# Patient Record
Sex: FEMALE | Race: OTHER | HISPANIC OR LATINO | URBAN - METROPOLITAN AREA
[De-identification: names, ages, dates, MRNs, and addresses within clinical notes are randomized per-mention and may not be internally consistent; named-entity substitution may affect disease eponyms.]

---

## 2021-01-01 ENCOUNTER — NURSE TRIAGE (OUTPATIENT)
Dept: OTHER | Facility: OTHER | Age: 26
End: 2021-01-01

## 2021-01-01 NOTE — TELEPHONE ENCOUNTER
Regarding: Illness-swelling   ----- Message from Lee Ann Parker sent at 1/1/2021  1:52 PM EST -----  "My cheek is swollen up    I had tooth pain, but that went away but the right side of my face is still swollen "

## 2021-01-01 NOTE — TELEPHONE ENCOUNTER
Pt c/o toothache on left upper side, left cheek swelling, redness at cheek and gum  Denies fever or itching  Pt states her toothache started yesterday morning when eating and had become worse overnight  Pt denies pain in tooth currently  Pt states "the one that hurts might be a little loose, the pain comes and goes as I eat for about 7 months now " Pt is not an established pt of [facility] and has stated that she "searched dentists online and called the number " Pt was placed in our queue and was triaged  Pt advised to go to closest ED or Urgent Care for evaluation  Pt advised that we are unable to contact a provider since she is not an established patient  Pt verbally understands  Reason for Disposition   Toothache    Answer Assessment - Initial Assessment Questions  1  ONSET: "When did the swelling start?" (e g , minutes, hours, days)      Last night   2  LOCATION: "What part of the face is swollen?"      Left cheek   3  SEVERITY: "How swollen is it?"      "Pretty swollen", moderate   4  ITCHING: "Is there any itching?" If so, ask: "How much?"   (Scale 1-10; mild, moderate or severe)      Denies   5  PAIN: "Is the swelling painful to touch?" If so, ask: "How painful is it?"   (Scale 1-10; mild, moderate or severe)      Denies pain at cheek   6  FEVER: "Do you have a fever?" If so, ask: "What is it, how was it measured, and when did it start?"       Denies   7  CAUSE: "What do you think is causing the face swelling?"      "My gums and cheek look red on that side so I thought it might be from that or from my tooth hurting"   8   RECURRENT SYMPTOM: "Have you had face swelling before?" If so, ask: "When was the last time?" "What happened that time?"      No  9  OTHER SYMPTOMS: "Do you have any other symptoms?" (e g , toothache, leg swelling)      Left side on top - toothache started yesterday morning when eating worse overnight, denies pain in tooth currently, "the one that hurts might be a little loose", "the pain comes and goes as I eat, about 7 months"   10   PREGNANCY: "Is there any chance you are pregnant?" "When was your last menstrual period?"       Denies, LMP: unknown    Protocols used: [facility]